# Patient Record
Sex: FEMALE | Race: WHITE | NOT HISPANIC OR LATINO | Employment: FULL TIME | URBAN - METROPOLITAN AREA
[De-identification: names, ages, dates, MRNs, and addresses within clinical notes are randomized per-mention and may not be internally consistent; named-entity substitution may affect disease eponyms.]

---

## 2025-05-13 PROBLEM — F99 CHRONIC MENTAL ILLNESS: Status: ACTIVE | Noted: 2025-05-13

## 2025-05-13 PROBLEM — T50.B95A FATIGUE AFTER COVID-19 VACCINATION: Status: ACTIVE | Noted: 2025-05-13

## 2025-05-13 PROBLEM — L81.4 OTHER MELANIN HYPERPIGMENTATION: Status: ACTIVE | Noted: 2021-04-12

## 2025-05-13 PROBLEM — H53.40 VISUAL FIELD LOSS: Status: ACTIVE | Noted: 2025-05-13

## 2025-05-13 PROBLEM — M25.531 ARTHRALGIA OF RIGHT WRIST: Status: ACTIVE | Noted: 2025-05-13

## 2025-05-13 PROBLEM — M54.16 LUMBAR RADICULOPATHY, CHRONIC: Status: ACTIVE | Noted: 2025-05-13

## 2025-05-13 PROBLEM — M62.81 WEAKNESS OF TRUNK MUSCULATURE: Status: ACTIVE | Noted: 2025-05-13

## 2025-05-13 PROBLEM — D22.39 MELANOCYTIC NEVI OF OTHER PARTS OF FACE: Status: ACTIVE | Noted: 2021-04-12

## 2025-05-13 PROBLEM — M54.50 LOWER BACK PAIN: Status: ACTIVE | Noted: 2025-05-13

## 2025-05-13 PROBLEM — J20.9 ACUTE BRONCHITIS: Status: RESOLVED | Noted: 2025-05-13 | Resolved: 2025-05-13

## 2025-05-13 PROBLEM — H53.30 BINOCULAR VISUAL DISTURBANCE: Status: ACTIVE | Noted: 2025-05-13

## 2025-05-13 PROBLEM — L90.5 SCAR CONDITION AND FIBROSIS OF SKIN: Status: ACTIVE | Noted: 2021-04-12

## 2025-05-13 PROBLEM — L91.0 HYPERTROPHIC SCAR: Status: ACTIVE | Noted: 2021-04-12

## 2025-05-13 PROBLEM — U07.1 COVID-19 WITH MULTIPLE COMORBIDITIES: Status: RESOLVED | Noted: 2025-05-13 | Resolved: 2025-05-13

## 2025-05-13 PROBLEM — L82.1 OTHER SEBORRHEIC KERATOSIS: Status: RESOLVED | Noted: 2021-04-12 | Resolved: 2025-05-13

## 2025-05-13 PROBLEM — I30.1: Status: RESOLVED | Noted: 2025-05-13 | Resolved: 2025-05-13

## 2025-05-13 PROBLEM — G45.9 BRAIN TIA: Status: ACTIVE | Noted: 2025-05-13

## 2025-05-13 PROBLEM — H52.7 REFRACTIVE ERROR: Status: ACTIVE | Noted: 2025-05-13

## 2025-05-13 PROBLEM — G43.909 MIGRAINE HEADACHE: Status: ACTIVE | Noted: 2025-05-13

## 2025-05-13 PROBLEM — G43.109 OPHTHALMIC MIGRAINE: Status: ACTIVE | Noted: 2025-05-13

## 2025-05-13 PROBLEM — M25.539 WRIST PAIN: Status: RESOLVED | Noted: 2025-05-13 | Resolved: 2025-05-13

## 2025-05-13 PROBLEM — R53.82 CHRONIC FATIGUE AND MALAISE: Status: ACTIVE | Noted: 2025-05-13

## 2025-05-13 PROBLEM — R29.898 WEAKNESS OF RIGHT HAND: Status: ACTIVE | Noted: 2025-05-13

## 2025-05-13 PROBLEM — J34.0 NASAL ULCER: Status: ACTIVE | Noted: 2025-05-13

## 2025-05-13 PROBLEM — R53.81 CHRONIC FATIGUE AND MALAISE: Status: ACTIVE | Noted: 2025-05-13

## 2025-05-13 PROBLEM — R41.89 BRAIN FOG: Status: ACTIVE | Noted: 2025-05-13

## 2025-05-13 PROBLEM — J01.90 ACUTE SINUSITIS: Status: RESOLVED | Noted: 2025-05-13 | Resolved: 2025-05-13

## 2025-05-13 PROBLEM — R50.9 FEVER AND CHILLS: Status: RESOLVED | Noted: 2025-05-13 | Resolved: 2025-05-13

## 2025-05-13 PROBLEM — B94.8 POST-VIRAL DISORDER: Status: ACTIVE | Noted: 2025-05-13

## 2025-05-13 PROBLEM — S01.20XA: Status: RESOLVED | Noted: 2025-05-13 | Resolved: 2025-05-13

## 2025-05-13 PROBLEM — D22.5 MELANOCYTIC NEVI OF TRUNK: Status: ACTIVE | Noted: 2021-04-12

## 2025-05-13 PROBLEM — L57.9 SKIN CHANGES DUE TO CHRONIC EXPOSURE TO NONIONIZING RADIATION, UNSPECIFIED: Status: ACTIVE | Noted: 2021-04-12

## 2025-05-13 PROBLEM — R63.8 ALTERATION IN APPETITE: Status: ACTIVE | Noted: 2025-05-13

## 2025-05-13 PROBLEM — D22.60 MELANOCYTIC NEVI OF UNSPECIFIED UPPER LIMB, INCLUDING SHOULDER: Status: ACTIVE | Noted: 2021-04-12

## 2025-05-13 PROBLEM — B00.9 HSV INFECTION: Status: RESOLVED | Noted: 2025-05-13 | Resolved: 2025-05-13

## 2025-05-13 PROBLEM — D48.5 NEOPLASM OF UNCERTAIN BEHAVIOR OF SKIN: Status: ACTIVE | Noted: 2021-04-12

## 2025-05-13 PROBLEM — R53.83 FATIGUE AFTER COVID-19 VACCINATION: Status: ACTIVE | Noted: 2025-05-13

## 2025-05-13 PROBLEM — Z97.3 WEARS GLASSES: Status: ACTIVE | Noted: 2025-05-13

## 2025-05-13 PROBLEM — Z85.828 PERSONAL HISTORY OF OTHER MALIGNANT NEOPLASM OF SKIN: Status: ACTIVE | Noted: 2021-04-12

## 2025-05-13 PROBLEM — F41.9 ANXIETY: Status: ACTIVE | Noted: 2025-05-13

## 2025-05-13 PROBLEM — G89.29 CHRONIC BACK PAIN GREATER THAN 3 MONTHS DURATION: Status: ACTIVE | Noted: 2025-05-13

## 2025-05-13 PROBLEM — L57.0 ACTINIC KERATOSIS: Status: RESOLVED | Noted: 2021-04-12 | Resolved: 2025-05-13

## 2025-05-13 PROBLEM — F43.10 POSTTRAUMATIC STRESS DISORDER: Status: ACTIVE | Noted: 2025-05-13

## 2025-05-13 PROBLEM — H25.13 NUCLEAR SCLEROSIS OF BOTH EYES: Status: ACTIVE | Noted: 2025-05-13

## 2025-05-13 PROBLEM — Z22.321 MSSA (METHICILLIN-SUSCEPTIBLE STAPHYLOCOCCUS AUREUS) COLONIZATION: Status: ACTIVE | Noted: 2025-05-13

## 2025-05-13 PROBLEM — D22.9 NEVUS: Status: ACTIVE | Noted: 2025-05-13

## 2025-05-13 PROBLEM — C44.629 SQUAMOUS CELL CARCINOMA OF SKIN OF LEFT UPPER LIMB, INCLUDING SHOULDER: Status: ACTIVE | Noted: 2021-04-12

## 2025-05-13 PROBLEM — M54.9 CHRONIC BACK PAIN GREATER THAN 3 MONTHS DURATION: Status: ACTIVE | Noted: 2025-05-13

## 2025-05-13 PROBLEM — S62.109A WRIST FRACTURE: Status: RESOLVED | Noted: 2025-05-13 | Resolved: 2025-05-13

## 2025-05-13 PROBLEM — H53.8 BLURRED VISION, BILATERAL: Status: ACTIVE | Noted: 2025-05-13

## 2025-05-15 ENCOUNTER — APPOINTMENT (OUTPATIENT)
Dept: PRIMARY CARE | Facility: CLINIC | Age: 60
End: 2025-05-15
Payer: COMMERCIAL

## 2025-05-15 VITALS
BODY MASS INDEX: 18.21 KG/M2 | DIASTOLIC BLOOD PRESSURE: 72 MMHG | HEIGHT: 67 IN | OXYGEN SATURATION: 97 % | WEIGHT: 116 LBS | HEART RATE: 64 BPM | SYSTOLIC BLOOD PRESSURE: 128 MMHG

## 2025-05-15 DIAGNOSIS — E55.9 VITAMIN D DEFICIENCY: ICD-10-CM

## 2025-05-15 DIAGNOSIS — Z79.899 ON LONG TERM DRUG THERAPY: ICD-10-CM

## 2025-05-15 DIAGNOSIS — Z13.1 SCREENING FOR DIABETES MELLITUS: ICD-10-CM

## 2025-05-15 DIAGNOSIS — Z12.31 ENCOUNTER FOR SCREENING MAMMOGRAM FOR MALIGNANT NEOPLASM OF BREAST: ICD-10-CM

## 2025-05-15 DIAGNOSIS — Z13.220 SCREENING FOR CHOLESTEROL LEVEL: ICD-10-CM

## 2025-05-15 DIAGNOSIS — R63.8 ALTERATION IN APPETITE: ICD-10-CM

## 2025-05-15 DIAGNOSIS — Z12.11 ENCOUNTER FOR COLORECTAL CANCER SCREENING: ICD-10-CM

## 2025-05-15 DIAGNOSIS — Z85.828 PERSONAL HISTORY OF OTHER MALIGNANT NEOPLASM OF SKIN: ICD-10-CM

## 2025-05-15 DIAGNOSIS — D64.9 ANEMIA, UNSPECIFIED TYPE: ICD-10-CM

## 2025-05-15 DIAGNOSIS — R53.82 CHRONIC FATIGUE AND MALAISE: ICD-10-CM

## 2025-05-15 DIAGNOSIS — I25.10 SINGLE VESSEL CORONARY ARTERY DISEASE: ICD-10-CM

## 2025-05-15 DIAGNOSIS — Z12.12 ENCOUNTER FOR COLORECTAL CANCER SCREENING: ICD-10-CM

## 2025-05-15 DIAGNOSIS — Z13.6 ENCOUNTER FOR LIPID SCREENING FOR CARDIOVASCULAR DISEASE: ICD-10-CM

## 2025-05-15 DIAGNOSIS — F41.9 ANXIETY: ICD-10-CM

## 2025-05-15 DIAGNOSIS — M54.16 LUMBAR RADICULOPATHY, CHRONIC: ICD-10-CM

## 2025-05-15 DIAGNOSIS — R73.09 BLOOD GLUCOSE ABNORMAL: ICD-10-CM

## 2025-05-15 DIAGNOSIS — Z13.29 SCREENING FOR THYROID DISORDER: ICD-10-CM

## 2025-05-15 DIAGNOSIS — Z13.21 ENCOUNTER FOR VITAMIN DEFICIENCY SCREENING: ICD-10-CM

## 2025-05-15 DIAGNOSIS — R53.81 CHRONIC FATIGUE AND MALAISE: ICD-10-CM

## 2025-05-15 DIAGNOSIS — Z00.00 ROUTINE ADULT HEALTH MAINTENANCE: Primary | ICD-10-CM

## 2025-05-15 DIAGNOSIS — F41.8 ANXIETY WITH DEPRESSION: ICD-10-CM

## 2025-05-15 DIAGNOSIS — Z13.220 ENCOUNTER FOR LIPID SCREENING FOR CARDIOVASCULAR DISEASE: ICD-10-CM

## 2025-05-15 PROBLEM — C44.629 SQUAMOUS CELL CARCINOMA OF SKIN OF LEFT UPPER LIMB, INCLUDING SHOULDER: Status: RESOLVED | Noted: 2021-04-12 | Resolved: 2025-05-15

## 2025-05-15 PROBLEM — D22.9 NEVUS: Status: RESOLVED | Noted: 2025-05-13 | Resolved: 2025-05-15

## 2025-05-15 PROBLEM — M62.81 WEAKNESS OF TRUNK MUSCULATURE: Status: RESOLVED | Noted: 2025-05-13 | Resolved: 2025-05-15

## 2025-05-15 PROBLEM — B94.8 POST-VIRAL DISORDER: Status: RESOLVED | Noted: 2025-05-13 | Resolved: 2025-05-15

## 2025-05-15 PROBLEM — Z97.3 WEARS GLASSES: Status: RESOLVED | Noted: 2025-05-13 | Resolved: 2025-05-15

## 2025-05-15 PROBLEM — R29.898 WEAKNESS OF RIGHT HAND: Status: RESOLVED | Noted: 2025-05-13 | Resolved: 2025-05-15

## 2025-05-15 PROBLEM — H53.40 VISUAL FIELD LOSS: Status: RESOLVED | Noted: 2025-05-13 | Resolved: 2025-05-15

## 2025-05-15 PROBLEM — L91.0 HYPERTROPHIC SCAR: Status: RESOLVED | Noted: 2021-04-12 | Resolved: 2025-05-15

## 2025-05-15 PROBLEM — R53.83 FATIGUE AFTER COVID-19 VACCINATION: Status: RESOLVED | Noted: 2025-05-13 | Resolved: 2025-05-15

## 2025-05-15 PROBLEM — D48.5 NEOPLASM OF UNCERTAIN BEHAVIOR OF SKIN: Status: RESOLVED | Noted: 2021-04-12 | Resolved: 2025-05-15

## 2025-05-15 PROBLEM — T50.B95A FATIGUE AFTER COVID-19 VACCINATION: Status: RESOLVED | Noted: 2025-05-13 | Resolved: 2025-05-15

## 2025-05-15 PROBLEM — J34.0 NASAL ULCER: Status: RESOLVED | Noted: 2025-05-13 | Resolved: 2025-05-15

## 2025-05-15 PROBLEM — H52.7 REFRACTIVE ERROR: Status: RESOLVED | Noted: 2025-05-13 | Resolved: 2025-05-15

## 2025-05-15 PROBLEM — L81.4 OTHER MELANIN HYPERPIGMENTATION: Status: RESOLVED | Noted: 2021-04-12 | Resolved: 2025-05-15

## 2025-05-15 PROBLEM — H53.8 BLURRED VISION, BILATERAL: Status: RESOLVED | Noted: 2025-05-13 | Resolved: 2025-05-15

## 2025-05-15 PROBLEM — R41.89 BRAIN FOG: Status: RESOLVED | Noted: 2025-05-13 | Resolved: 2025-05-15

## 2025-05-15 PROBLEM — H53.30 BINOCULAR VISUAL DISTURBANCE: Status: RESOLVED | Noted: 2025-05-13 | Resolved: 2025-05-15

## 2025-05-15 PROCEDURE — 3008F BODY MASS INDEX DOCD: CPT | Performed by: NURSE PRACTITIONER

## 2025-05-15 PROCEDURE — 99386 PREV VISIT NEW AGE 40-64: CPT | Performed by: NURSE PRACTITIONER

## 2025-05-15 RX ORDER — IBUPROFEN 200 MG
200 TABLET ORAL EVERY 6 HOURS PRN
COMMUNITY

## 2025-05-15 RX ORDER — CLONAZEPAM 0.5 MG/1
1 TABLET ORAL NIGHTLY PRN
COMMUNITY
Start: 2024-10-18

## 2025-05-15 ASSESSMENT — ANXIETY QUESTIONNAIRES
2. NOT BEING ABLE TO STOP OR CONTROL WORRYING: NEARLY EVERY DAY
7. FEELING AFRAID AS IF SOMETHING AWFUL MIGHT HAPPEN: NEARLY EVERY DAY
6. BECOMING EASILY ANNOYED OR IRRITABLE: NEARLY EVERY DAY
5. BEING SO RESTLESS THAT IT IS HARD TO SIT STILL: NEARLY EVERY DAY
4. TROUBLE RELAXING: NEARLY EVERY DAY
GAD7 TOTAL SCORE: 21
3. WORRYING TOO MUCH ABOUT DIFFERENT THINGS: NEARLY EVERY DAY
IF YOU CHECKED OFF ANY PROBLEMS ON THIS QUESTIONNAIRE, HOW DIFFICULT HAVE THESE PROBLEMS MADE IT FOR YOU TO DO YOUR WORK, TAKE CARE OF THINGS AT HOME, OR GET ALONG WITH OTHER PEOPLE: EXTREMELY DIFFICULT
1. FEELING NERVOUS, ANXIOUS, OR ON EDGE: NEARLY EVERY DAY

## 2025-05-15 ASSESSMENT — PATIENT HEALTH QUESTIONNAIRE - PHQ9
1. LITTLE INTEREST OR PLEASURE IN DOING THINGS: NEARLY EVERY DAY
4. FEELING TIRED OR HAVING LITTLE ENERGY: NEARLY EVERY DAY
7. TROUBLE CONCENTRATING ON THINGS, SUCH AS READING THE NEWSPAPER OR WATCHING TELEVISION: NEARLY EVERY DAY
5. POOR APPETITE OR OVEREATING: NEARLY EVERY DAY
9. THOUGHTS THAT YOU WOULD BE BETTER OFF DEAD, OR OF HURTING YOURSELF: NOT AT ALL
8. MOVING OR SPEAKING SO SLOWLY THAT OTHER PEOPLE COULD HAVE NOTICED. OR THE OPPOSITE, BEING SO FIGETY OR RESTLESS THAT YOU HAVE BEEN MOVING AROUND A LOT MORE THAN USUAL: NEARLY EVERY DAY
3. TROUBLE FALLING OR STAYING ASLEEP OR SLEEPING TOO MUCH: MORE THAN HALF THE DAYS
2. FEELING DOWN, DEPRESSED OR HOPELESS: MORE THAN HALF THE DAYS
SUM OF ALL RESPONSES TO PHQ QUESTIONS 1-9: 20
6. FEELING BAD ABOUT YOURSELF - OR THAT YOU ARE A FAILURE OR HAVE LET YOURSELF OR YOUR FAMILY DOWN: SEVERAL DAYS
SUM OF ALL RESPONSES TO PHQ9 QUESTIONS 1 AND 2: 5
10. IF YOU CHECKED OFF ANY PROBLEMS, HOW DIFFICULT HAVE THESE PROBLEMS MADE IT FOR YOU TO DO YOUR WORK, TAKE CARE OF THINGS AT HOME, OR GET ALONG WITH OTHER PEOPLE: EXTREMELY DIFFICULT

## 2025-05-15 ASSESSMENT — ENCOUNTER SYMPTOMS
FATIGUE: 1
LOSS OF SENSATION IN FEET: 0
ABDOMINAL PAIN: 0
WOUND: 0
EYE PAIN: 0
MYALGIAS: 0
DIZZINESS: 0
ROS SKIN COMMENTS: HISTORY OF SKIN CANCER
OCCASIONAL FEELINGS OF UNSTEADINESS: 0
HEADACHES: 1
DIARRHEA: 0
CONSTIPATION: 0
BACK PAIN: 1
SHORTNESS OF BREATH: 0
BRUISES/BLEEDS EASILY: 0
CHILLS: 0
FEVER: 0
COUGH: 0
WHEEZING: 0
ABDOMINAL DISTENTION: 0
DIFFICULTY URINATING: 0
DEPRESSION: 0
SEIZURES: 0
TROUBLE SWALLOWING: 0
JOINT SWELLING: 0
COLOR CHANGE: 0
NAUSEA: 0
WEAKNESS: 0
ADENOPATHY: 0
PALPITATIONS: 1

## 2025-05-15 ASSESSMENT — COLUMBIA-SUICIDE SEVERITY RATING SCALE - C-SSRS
6. HAVE YOU EVER DONE ANYTHING, STARTED TO DO ANYTHING, OR PREPARED TO DO ANYTHING TO END YOUR LIFE?: NO
2. HAVE YOU ACTUALLY HAD ANY THOUGHTS OF KILLING YOURSELF?: NO
1. IN THE PAST MONTH, HAVE YOU WISHED YOU WERE DEAD OR WISHED YOU COULD GO TO SLEEP AND NOT WAKE UP?: NO

## 2025-05-15 NOTE — ASSESSMENT & PLAN NOTE
Patient reports that she weaned herself off of Sertraline 100mg daily and Lamotrigine 200mg daily.  She continues to take Clonazepam 0.5mg daily PRN very sparingly.  Mood is currently not controlled but she is agreeable to follow-up with counseling services and psychiatric services for additional medication recommendations and support services.

## 2025-05-15 NOTE — ASSESSMENT & PLAN NOTE
Secondary to mood disturbances?  Patient reports no disrupted sleep.  Additional blood work ordered today for assessment purposes

## 2025-05-15 NOTE — ASSESSMENT & PLAN NOTE
Routine blood work ordered today for assessment purposes.  Will continue to monitor as appropriate  -Orders placed for screening mammogram and Cologuard testing to be completed

## 2025-05-15 NOTE — ASSESSMENT & PLAN NOTE
Patient reports an approximate 15 pound weight loss since the beginning of the year secondary to uncontrolled anxiety.  Referral for nutritionist was declined at this time.  Patient is agreeable to see counseling services and psychiatry

## 2025-05-15 NOTE — PROGRESS NOTES
Do you have:  Any eye problems:    Y Glasses  2. Frequent nasal congestion or sneezing:  N  3. Difficulty hearing:  N  4. Ear problems:   N  Are you troubled by:  5. Asthma or wheezing:   N  6. Frequent cough:   N  7. Shortness of breath:N  8. Hemoptysis: N  9. Hx of TB: N  Do you have or have you been told you had:  10. High blood pressure: N  11. Heart disease: N  12. Heart murmur: N  Do you ever have:  13. Chest pain or pressure with exertion:N  14. Leg pains with walking up hill: N  15. Fast heartbeat or palpitations:N  16. Varicose veins: N  Do you have or are you troubled by:  17. Difficulty swallowing foods or liquids: N  18. Abdominal pains: N  19. Frequent indigestion or heartburn: N  20. Constipation: N  21. Diarrhea or loose stools: N  Has there been a definite change:  22. In weight recently: N  23. In bowel movements: N  Have you ever had or been told you have:  24. An ulcer: N  25. Black stools: N  26. Jaundice, hepatitis or liver problems: N  27. Gallstones or gallbladder problems: N  28. Stomach or intestinal problems: N  Have you ever:  29. Vomited blood : N  30. Blood in bowel movements: N  31. Been anemic or been treated for blood problems: N  32. Had sickle cell trait or anemia: N  33. Been refused as a blood donor:   Have you had or do you have:  34. Problems with your kidney, bladder, or prostate: N  35. Loss of control of your urine: N  36. Pain or burning with urination: N  37. Blood in your urine: N  38. Trouble starting flow of urine: N  39. Frequent urination at night: N  Have you ever been treated for or told you had:  40. Venereal disease: N  Do you have:  41. Any skin problems: N  42. Diabetes: N  43. Thyroid disease: N  Are you troubled by:  44. Frequent back pain: N  45. Pain or swelling around joints: N  Have you ever:  46. Broken any bones: N  Are you troubled by:  47. Frequent headaches: N  48. Dizziness: N  49. Had Seizures or convulsions: N  50. Temporarily lost control of your  hand or foot : N   51. Had a stroke or been paralyzed : N  52. Temporarily lost your ability to speak: N  53. Fainted or lost consciousness: N  Have you ever had:  54. Hallucinations: N  55. Much trouble with Nervousness: N  56. Do you take medications for your nerves: N  57. Trouble falling asleep or staying asleep: N  58. Do you feel tired even after a good night sleep: Y  59. Do you often feel down in the dumps or depressed: Y  60. Do you often feel like crying without any reason: N  61. Do you think that you may be using alcohol excessively: N  62. Do you use any street drugs : N     Do have any other medical problems that are concerning to you :      Subjective   Patient ID: Orquidea Lopez is a 60 y.o. female who presents for Establish Care and Annual Exam.    Patient seen today in order to establish primary care as well as complete an annual physical exam.  Patient has not seen a primary care provider in several years but wanted to follow-up with someone due to various concerns.  Patient states that she was following with psychiatry and counseling services but stopped at the end of last year due to issues with insurance.  She has since weaned herself off of sertraline and lamotrigine but continues to utilize previously prescribed clonazepam sparingly for episodes of increased anxiety.  Persistent issues with uncontrolled anxiety and depression reported.  Patient reports a relatively poor support system as her  is an alcoholic and she takes care of her sister who is also an alcoholic.  Patient states that she works very part-time hours which gets her out of the house and it appears to be a good distraction.  Severe fatigue also reported although patient denies any significant sleep disturbances.  She reports palpitations as well as occasional bowel incontinence when she has severe anxiety.  Patient reports approximately 15 pound weight loss this year secondary to stress.  She denies any issues with  dysphagia reports only eating 1 meal a day and then occasionally snacking.  Patient is not interested in following up with nutritionist at this time.  Discussed patient history of cardiac cath with stent placement.  She was previously prescribed to take baby aspirin daily but has not done so.  She is also not interested in following up with any additional specialist at this time.  Discussed history of skin cancer as well although patient is not interested in seeing any dermatologist as of now.  Patient reports flares of chronic lower back pain secondary to degenerative disc disease.  She reports that pain is improved when she does stretches or exercises but has not done much as of late.  Patient is not interested in following up with physical therapy services at this time.  She reports that as needed Motrin does help in addition to previously prescribed tramadol.  Patient does not drink alcohol and quit smoking cigarettes in 2022 but continues to vape.  She denies any persistent respiratory concerns including coughing, wheezing or shortness of breath.  Patient denies any issues with dentition but reports occasional ocular migraines several times a year.  Medications reviewed.  No other acute concerns voiced at this time.             Medications Ordered Prior to Encounter[1]    Medical History[2]     Surgical History[3]     Family History[4]     Review of Systems   Constitutional:  Positive for fatigue. Negative for chills and fever.        Reports debilitating fatigue   HENT:  Negative for dental problem and trouble swallowing.    Eyes:  Negative for pain and visual disturbance.        Wears glasses   Respiratory:  Negative for cough, shortness of breath and wheezing.    Cardiovascular:  Positive for palpitations. Negative for chest pain and leg swelling.        Reports occasional palpitations secondary to severe anxiety   Gastrointestinal:  Negative for abdominal distention, abdominal pain, constipation, diarrhea  "and nausea.        Reports labile bowel movements with occasional bowel incontinence secondary to anxiety   Endocrine: Negative for cold intolerance and heat intolerance.   Genitourinary:  Negative for difficulty urinating.   Musculoskeletal:  Positive for back pain. Negative for gait problem, joint swelling and myalgias.   Skin:  Negative for color change, pallor, rash and wound.        History of skin cancer   Allergic/Immunologic: Negative for environmental allergies and food allergies.   Neurological:  Positive for headaches. Negative for dizziness, seizures and weakness.        Reports ocular migraines   Hematological:  Negative for adenopathy. Does not bruise/bleed easily.   Psychiatric/Behavioral:  Negative for behavioral problems.         Reports anxiety and depression   All other systems reviewed and are negative.      Objective   /72   Pulse 64   Ht 1.694 m (5' 6.7\")   Wt 52.6 kg (116 lb)   SpO2 97%   BMI 18.33 kg/m²     Physical Exam  Constitutional:       General: She is not in acute distress.     Appearance: Normal appearance. She is not toxic-appearing.   HENT:      Head: Normocephalic and atraumatic.      Right Ear: Tympanic membrane, ear canal and external ear normal.      Left Ear: Tympanic membrane, ear canal and external ear normal.      Nose: Nose normal.      Mouth/Throat:      Mouth: Mucous membranes are moist.      Pharynx: Oropharynx is clear.   Eyes:      Extraocular Movements: Extraocular movements intact.      Conjunctiva/sclera: Conjunctivae normal.      Pupils: Pupils are equal, round, and reactive to light.   Cardiovascular:      Rate and Rhythm: Normal rate and regular rhythm.      Pulses: Normal pulses.      Heart sounds: Normal heart sounds. No murmur heard.  Pulmonary:      Effort: Pulmonary effort is normal.      Breath sounds: Normal breath sounds. No wheezing.   Abdominal:      General: Bowel sounds are normal.      Palpations: Abdomen is soft.   Musculoskeletal:       "   General: No swelling.      Cervical back: Normal range of motion and neck supple.   Skin:     General: Skin is warm and dry.   Neurological:      General: No focal deficit present.      Mental Status: She is alert and oriented to person, place, and time. Mental status is at baseline.      Cranial Nerves: No cranial nerve deficit.      Motor: No weakness.   Psychiatric:         Mood and Affect: Mood normal.         Behavior: Behavior normal.         Thought Content: Thought content normal.         Judgment: Judgment normal.         Assessment/Plan   Problem List Items Addressed This Visit           ICD-10-CM    Personal history of other malignant neoplasm of skin Z85.828    Patient reports history of skin cancer with subsequent Mohs surgery but is not interested in following up with dermatology at this time despite some concerns regarding skin.  Will continue to reassess patient's readiness to follow-up with dermatology specialist for continued evaluation purposes         Lumbar radiculopathy, chronic M54.16    Patient reports intermittent yet chronic lower back pain due to degenerative disc disease.  She states that stretching and physical activity helps but is not interested in follow-up with physical therapy services at this time.  Encouraged patient to do stretching/yoga/other low impact activities for improved pain control in addition to as needed Motrin         Chronic fatigue and malaise R53.82, R53.81    Secondary to mood disturbances?  Patient reports no disrupted sleep.  Additional blood work ordered today for assessment purposes         Anxiety F41.9    Alteration in appetite R63.8    Patient reports an approximate 15 pound weight loss since the beginning of the year secondary to uncontrolled anxiety.  Referral for nutritionist was declined at this time.  Patient is agreeable to see counseling services and psychiatry         Anxiety with depression F41.8    Patient reports that she weaned herself off of  "Sertraline 100mg daily and Lamotrigine 200mg daily.  She continues to take Clonazepam 0.5mg daily PRN very sparingly.  Mood is currently not controlled but she is agreeable to follow-up with counseling services and psychiatric services for additional medication recommendations and support services.         Relevant Orders    Referral to Psychiatry    Follow Up In [facility]    Routine adult health maintenance - Primary Z00.00    Routine blood work ordered today for assessment purposes.  Will continue to monitor as appropriate  -Orders placed for screening mammogram and Cologuard testing to be completed         Relevant Orders    Comprehensive Metabolic Panel    TSH with reflex to Free T4 if abnormal    Lipid Panel    CBC and Auto Differential    Single vessel coronary artery disease I25.10    In 2022, patient underwent a cardiac cath which showed 70% proximal LAD stable lesion had PCI. \"Cardiology recommended low dose aspirin indefinitely as well as Brilinta for 12mths post PCI, atorvastatin 40mg daily, beta-blockers were held d/t patient's report of prior intolerance to metoprolol\".  Patient now takes no scheduled medications and has not had any recent cardiology follow-up.  She is currently asymptomatic at this time.          Other Visit Diagnoses         Codes      Screening for diabetes mellitus     Z13.1    Relevant Orders    Hemoglobin A1C      Blood glucose abnormal     R73.09    Relevant Orders    Hemoglobin A1C      Screening for thyroid disorder     Z13.29    Relevant Orders    TSH with reflex to Free T4 if abnormal      Screening for cholesterol level     Z13.220    Relevant Orders    Lipid Panel      Encounter for lipid screening for cardiovascular disease     Z13.220, Z13.6    Relevant Orders    Lipid Panel      Encounter for vitamin deficiency screening     Z13.21    Relevant Orders    Vitamin D 25-Hydroxy,Total (for eval of Vitamin D levels)      " On long term drug therapy     Z79.899    Relevant Orders    Vitamin D 25-Hydroxy,Total (for eval of Vitamin D levels)      Vitamin D deficiency     E55.9    Relevant Orders    Vitamin D 25-Hydroxy,Total (for eval of Vitamin D levels)      Encounter for screening mammogram for malignant neoplasm of breast     Z12.31    Relevant Orders    BI mammo bilateral screening tomosynthesis      Encounter for colorectal cancer screening     Z12.11, Z12.12    Relevant Orders    Cologuard® colon cancer screening      Anemia, unspecified type     D64.9    Relevant Orders    Vitamin B12    Iron and TIBC    Ferritin                    [1]   Current Outpatient Medications on File Prior to Visit   Medication Sig Dispense Refill    clonazePAM (KlonoPIN) 0.5 mg tablet Take 1 tablet (0.5 mg) by mouth as needed at bedtime for anxiety.      ibuprofen 200 mg tablet Take 1 tablet (200 mg) by mouth every 6 hours if needed for mild pain (1 - 3).       No current facility-administered medications on file prior to visit.   [2]   Past Medical History:  Diagnosis Date    Actinic keratosis 04/12/2021    Acute bronchitis 05/13/2025    ADHD (attention deficit hyperactivity disorder)     Anxiety     Arthritis     Depression     Eating disorder     Fever and chills 05/13/2025    Other seborrheic keratosis 04/12/2021    Other symptoms and signs involving the nervous system     Aura    Personal history of other diseases of the nervous system and sense organs     History of migraine    Viral pericarditis with pericardial effusion ([facility]) 05/13/2025    Wound, open, nasal, cavity 05/13/2025    Wrist fracture 05/13/2025   [3]   Past Surgical History:  Procedure Laterality Date    CARDIAC CATHETERIZATION      CORONARY STENT PLACEMENT      OTHER SURGICAL HISTORY  11/19/2018    Tubal ligation    OTHER SURGICAL HISTORY  11/19/2018    Sasser tooth extraction   [4]   Family History  Problem Relation Name Age of Onset    Alcohol abuse Mother Mother     Asthma  Mother Mother     Cancer Mother Mother     Diabetes Mother Mother     Coronary artery disease Mother Mother     Early natural death Father Kal     Heart attack Father Kal     Coronary artery disease Father Kal     Alcohol abuse Father Kal     Alcohol abuse Sister Kenzie     Ovarian cancer Sister Kenzie     Coronary artery disease Brother      Alcohol abuse Brother      Breast cancer Mother's Sister Emily     Coronary artery disease Maternal Grandmother      Coronary artery disease Maternal Grandfather      Coronary artery disease Paternal Grandmother      Coronary artery disease Paternal Grandfather

## 2025-05-15 NOTE — PATIENT INSTRUCTIONS
Orders are in place for you to complete imaging for additional assessment purposes (Mammogram).  You can have this completed at [facility].  Please contact the radiology department there to schedule.  The number is [phone]  [phone]  [phone]      Orders are in place for you to have fasting blood work completed. Please do not eat or drink 8 hours prior to having your blood drawn.   You may have your blood work completed in this building through [employer] software ([address] [facility] [address], [place] [address]).  For this location, you may schedule an appointment online or drop-in for walk-in services. [email or web address]  Hours:   Monday - Friday: 7:30 a.m. - 5 p.m. and Saturday: 8 a.m. - 11 a.m.  Phone:  [phone]      Please arrange for routine follow up in 3 months. You may schedule on-line through [employer] or call our office at [phone].

## 2025-05-15 NOTE — ASSESSMENT & PLAN NOTE
Patient reports intermittent yet chronic lower back pain due to degenerative disc disease.  She states that stretching and physical activity helps but is not interested in follow-up with physical therapy services at this time.  Encouraged patient to do stretching/yoga/other low impact activities for improved pain control in addition to as needed Motrin

## 2025-05-16 LAB
25(OH)D3+25(OH)D2 SERPL-MCNC: 16 NG/ML (ref 30–100)
ALBUMIN SERPL-MCNC: 4.7 G/DL (ref 3.6–5.1)
ALP SERPL-CCNC: 72 U/L (ref 37–153)
ALT SERPL-CCNC: 10 U/L (ref 6–29)
ANION GAP SERPL CALCULATED.4IONS-SCNC: 9 MMOL/L (CALC) (ref 7–17)
AST SERPL-CCNC: 17 U/L (ref 10–35)
BASOPHILS # BLD AUTO: 68 CELLS/UL (ref 0–200)
BASOPHILS NFR BLD AUTO: 0.9 %
BILIRUB SERPL-MCNC: 0.5 MG/DL (ref 0.2–1.2)
BUN SERPL-MCNC: 13 MG/DL (ref 7–25)
CALCIUM SERPL-MCNC: 9.7 MG/DL (ref 8.6–10.4)
CHLORIDE SERPL-SCNC: 104 MMOL/L (ref 98–110)
CHOLEST SERPL-MCNC: 189 MG/DL
CHOLEST/HDLC SERPL: 2.7 (CALC)
CO2 SERPL-SCNC: 29 MMOL/L (ref 20–32)
CREAT SERPL-MCNC: 0.69 MG/DL (ref 0.5–1.05)
EGFRCR SERPLBLD CKD-EPI 2021: 98 ML/MIN/1.73M2
EOSINOPHIL # BLD AUTO: 113 CELLS/UL (ref 15–500)
EOSINOPHIL NFR BLD AUTO: 1.5 %
ERYTHROCYTE [DISTWIDTH] IN BLOOD BY AUTOMATED COUNT: 12.4 % (ref 11–15)
EST. AVERAGE GLUCOSE BLD GHB EST-MCNC: 117 MG/DL
EST. AVERAGE GLUCOSE BLD GHB EST-SCNC: 6.5 MMOL/L
FERRITIN SERPL-MCNC: 50 NG/ML (ref 16–288)
GLUCOSE SERPL-MCNC: 102 MG/DL (ref 65–99)
HBA1C MFR BLD: 5.7 %
HCT VFR BLD AUTO: 41.8 % (ref 35–45)
HDLC SERPL-MCNC: 71 MG/DL
HGB BLD-MCNC: 13.5 G/DL (ref 11.7–15.5)
IRON SATN MFR SERPL: 32 % (CALC) (ref 16–45)
IRON SERPL-MCNC: 115 MCG/DL (ref 45–160)
LDLC SERPL CALC-MCNC: 102 MG/DL (CALC)
LYMPHOCYTES # BLD AUTO: 2145 CELLS/UL (ref 850–3900)
LYMPHOCYTES NFR BLD AUTO: 28.6 %
MCH RBC QN AUTO: 30.5 PG (ref 27–33)
MCHC RBC AUTO-ENTMCNC: 32.3 G/DL (ref 32–36)
MCV RBC AUTO: 94.6 FL (ref 80–100)
MONOCYTES # BLD AUTO: 443 CELLS/UL (ref 200–950)
MONOCYTES NFR BLD AUTO: 5.9 %
NEUTROPHILS # BLD AUTO: 4733 CELLS/UL (ref 1500–7800)
NEUTROPHILS NFR BLD AUTO: 63.1 %
NONHDLC SERPL-MCNC: 118 MG/DL (CALC)
PLATELET # BLD AUTO: 304 THOUSAND/UL (ref 140–400)
PMV BLD REES-ECKER: 9.7 FL (ref 7.5–12.5)
POTASSIUM SERPL-SCNC: 4.2 MMOL/L (ref 3.5–5.3)
PROT SERPL-MCNC: 7.1 G/DL (ref 6.1–8.1)
RBC # BLD AUTO: 4.42 MILLION/UL (ref 3.8–5.1)
SODIUM SERPL-SCNC: 142 MMOL/L (ref 135–146)
TIBC SERPL-MCNC: 358 MCG/DL (CALC) (ref 250–450)
TRIGL SERPL-MCNC: 71 MG/DL
TSH SERPL-ACNC: 1.49 MIU/L (ref 0.4–4.5)
VIT B12 SERPL-MCNC: 281 PG/ML (ref 200–1100)
WBC # BLD AUTO: 7.5 THOUSAND/UL (ref 3.8–10.8)

## 2025-05-27 LAB — NONINV COLON CA DNA+OCC BLD SCRN STL QL: NEGATIVE

## 2025-05-29 ENCOUNTER — APPOINTMENT (OUTPATIENT)
Dept: RADIOLOGY | Facility: HOSPITAL | Age: 60
End: 2025-05-29
Payer: COMMERCIAL

## 2025-05-29 DIAGNOSIS — Z12.31 ENCOUNTER FOR SCREENING MAMMOGRAM FOR MALIGNANT NEOPLASM OF BREAST: ICD-10-CM

## 2025-05-29 PROCEDURE — 77063 BREAST TOMOSYNTHESIS BI: CPT

## 2025-05-29 PROCEDURE — 77067 SCR MAMMO BI INCL CAD: CPT

## 2025-05-29 PROCEDURE — 77063 BREAST TOMOSYNTHESIS BI: CPT | Performed by: STUDENT IN AN ORGANIZED HEALTH CARE EDUCATION/TRAINING PROGRAM

## 2025-05-29 PROCEDURE — 77067 SCR MAMMO BI INCL CAD: CPT | Performed by: STUDENT IN AN ORGANIZED HEALTH CARE EDUCATION/TRAINING PROGRAM
